# Patient Record
Sex: FEMALE | Race: WHITE | Employment: PART TIME | URBAN - METROPOLITAN AREA
[De-identification: names, ages, dates, MRNs, and addresses within clinical notes are randomized per-mention and may not be internally consistent; named-entity substitution may affect disease eponyms.]

---

## 2018-05-12 LAB
CHOLESTEROL, TOTAL: 180 MG/DL
CHOLESTEROL/HDL RATIO: 3.1
HDLC SERPL-MCNC: 59 MG/DL (ref 35–70)
LDL CHOLESTEROL CALCULATED: 108 MG/DL (ref 0–160)
NONHDLC SERPL-MCNC: NORMAL MG/DL
TRIGL SERPL-MCNC: 63 MG/DL
VLDLC SERPL CALC-MCNC: NORMAL MG/DL

## 2018-05-14 ENCOUNTER — TELEPHONE (OUTPATIENT)
Dept: FAMILY MEDICINE CLINIC | Age: 38
End: 2018-05-14

## 2018-05-15 ENCOUNTER — TELEPHONE (OUTPATIENT)
Dept: INTERNAL MEDICINE CLINIC | Age: 38
End: 2018-05-15

## 2018-05-16 ENCOUNTER — OFFICE VISIT (OUTPATIENT)
Dept: INTERNAL MEDICINE CLINIC | Age: 38
End: 2018-05-16

## 2018-05-16 VITALS
BODY MASS INDEX: 25.16 KG/M2 | HEART RATE: 82 BPM | SYSTOLIC BLOOD PRESSURE: 100 MMHG | DIASTOLIC BLOOD PRESSURE: 76 MMHG | OXYGEN SATURATION: 98 % | HEIGHT: 64 IN | WEIGHT: 147.4 LBS

## 2018-05-16 DIAGNOSIS — F41.9 ANXIETY: Primary | ICD-10-CM

## 2018-05-16 DIAGNOSIS — F41.9 ANXIETY: ICD-10-CM

## 2018-05-16 LAB
T4 FREE: 1.5 NG/DL (ref 0.9–1.8)
TSH SERPL DL<=0.05 MIU/L-ACNC: 0.65 UIU/ML (ref 0.27–4.2)

## 2018-05-16 PROCEDURE — 99214 OFFICE O/P EST MOD 30 MIN: CPT | Performed by: NURSE PRACTITIONER

## 2018-05-16 RX ORDER — LORAZEPAM 1 MG/1
1 TABLET ORAL 2 TIMES DAILY
COMMUNITY
Start: 2018-05-12 | End: 2020-11-09

## 2018-05-16 RX ORDER — ESCITALOPRAM OXALATE 5 MG/1
5 TABLET ORAL DAILY
Qty: 30 TABLET | Refills: 0 | Status: SHIPPED | OUTPATIENT
Start: 2018-05-16 | End: 2018-07-09 | Stop reason: SDUPTHER

## 2018-05-16 ASSESSMENT — PATIENT HEALTH QUESTIONNAIRE - PHQ9
SUM OF ALL RESPONSES TO PHQ9 QUESTIONS 1 & 2: 0
1. LITTLE INTEREST OR PLEASURE IN DOING THINGS: 0
2. FEELING DOWN, DEPRESSED OR HOPELESS: 0
SUM OF ALL RESPONSES TO PHQ QUESTIONS 1-9: 0

## 2018-05-17 ASSESSMENT — ENCOUNTER SYMPTOMS
GASTROINTESTINAL NEGATIVE: 1
CHEST TIGHTNESS: 1

## 2018-07-18 ENCOUNTER — TELEPHONE (OUTPATIENT)
Dept: INTERNAL MEDICINE CLINIC | Age: 38
End: 2018-07-18

## 2020-11-09 ENCOUNTER — NURSE TRIAGE (OUTPATIENT)
Dept: OTHER | Facility: CLINIC | Age: 40
End: 2020-11-09

## 2020-11-09 ENCOUNTER — OFFICE VISIT (OUTPATIENT)
Dept: FAMILY MEDICINE CLINIC | Age: 40
End: 2020-11-09
Payer: COMMERCIAL

## 2020-11-09 VITALS
OXYGEN SATURATION: 98 % | HEIGHT: 64 IN | HEART RATE: 87 BPM | DIASTOLIC BLOOD PRESSURE: 80 MMHG | WEIGHT: 146 LBS | TEMPERATURE: 98.4 F | BODY MASS INDEX: 24.92 KG/M2 | SYSTOLIC BLOOD PRESSURE: 118 MMHG

## 2020-11-09 DIAGNOSIS — F41.1 GENERALIZED ANXIETY DISORDER: ICD-10-CM

## 2020-11-09 LAB
A/G RATIO: 2.1 (ref 1.1–2.2)
ALBUMIN SERPL-MCNC: 4.8 G/DL (ref 3.4–5)
ALP BLD-CCNC: 57 U/L (ref 40–129)
ALT SERPL-CCNC: 11 U/L (ref 10–40)
ANION GAP SERPL CALCULATED.3IONS-SCNC: 13 MMOL/L (ref 3–16)
AST SERPL-CCNC: 16 U/L (ref 15–37)
BASOPHILS ABSOLUTE: 0 K/UL (ref 0–0.2)
BASOPHILS RELATIVE PERCENT: 0.5 %
BILIRUB SERPL-MCNC: 0.8 MG/DL (ref 0–1)
BUN BLDV-MCNC: 7 MG/DL (ref 7–20)
CALCIUM SERPL-MCNC: 9.9 MG/DL (ref 8.3–10.6)
CHLORIDE BLD-SCNC: 101 MMOL/L (ref 99–110)
CO2: 24 MMOL/L (ref 21–32)
CREAT SERPL-MCNC: 0.6 MG/DL (ref 0.6–1.1)
EOSINOPHILS ABSOLUTE: 0 K/UL (ref 0–0.6)
EOSINOPHILS RELATIVE PERCENT: 0.1 %
GFR AFRICAN AMERICAN: >60
GFR NON-AFRICAN AMERICAN: >60
GLOBULIN: 2.3 G/DL
GLUCOSE BLD-MCNC: 85 MG/DL (ref 70–99)
HCT VFR BLD CALC: 41.1 % (ref 36–48)
HEMOGLOBIN: 13.4 G/DL (ref 12–16)
LYMPHOCYTES ABSOLUTE: 1.1 K/UL (ref 1–5.1)
LYMPHOCYTES RELATIVE PERCENT: 16.4 %
MCH RBC QN AUTO: 27.9 PG (ref 26–34)
MCHC RBC AUTO-ENTMCNC: 32.7 G/DL (ref 31–36)
MCV RBC AUTO: 85.3 FL (ref 80–100)
MONOCYTES ABSOLUTE: 0.4 K/UL (ref 0–1.3)
MONOCYTES RELATIVE PERCENT: 5.3 %
NEUTROPHILS ABSOLUTE: 5.2 K/UL (ref 1.7–7.7)
NEUTROPHILS RELATIVE PERCENT: 77.7 %
PDW BLD-RTO: 13.7 % (ref 12.4–15.4)
PLATELET # BLD: 235 K/UL (ref 135–450)
PMV BLD AUTO: 9.2 FL (ref 5–10.5)
POTASSIUM SERPL-SCNC: 4.2 MMOL/L (ref 3.5–5.1)
RBC # BLD: 4.82 M/UL (ref 4–5.2)
SODIUM BLD-SCNC: 138 MMOL/L (ref 136–145)
TOTAL PROTEIN: 7.1 G/DL (ref 6.4–8.2)
TSH REFLEX FT4: 0.73 UIU/ML (ref 0.27–4.2)
WBC # BLD: 6.6 K/UL (ref 4–11)

## 2020-11-09 PROCEDURE — G0444 DEPRESSION SCREEN ANNUAL: HCPCS | Performed by: FAMILY MEDICINE

## 2020-11-09 PROCEDURE — 99203 OFFICE O/P NEW LOW 30 MIN: CPT | Performed by: FAMILY MEDICINE

## 2020-11-09 RX ORDER — BUSPIRONE HYDROCHLORIDE 5 MG/1
5 TABLET ORAL 3 TIMES DAILY
Qty: 90 TABLET | Refills: 0 | Status: SHIPPED | OUTPATIENT
Start: 2020-11-09 | End: 2020-12-18

## 2020-11-09 RX ORDER — MAGNESIUM 200 MG
936 TABLET ORAL DAILY
COMMUNITY
End: 2021-05-26

## 2020-11-09 RX ORDER — PREGNENOLONE, MICRONIZED 100 %
10 POWDER (GRAM) MISCELLANEOUS 2 TIMES DAILY
COMMUNITY
End: 2021-04-19

## 2020-11-09 RX ORDER — SERTRALINE HYDROCHLORIDE 25 MG/1
25 TABLET, FILM COATED ORAL DAILY
Qty: 30 TABLET | Refills: 3 | Status: SHIPPED | OUTPATIENT
Start: 2020-11-09 | End: 2021-04-19

## 2020-11-09 SDOH — HEALTH STABILITY: MENTAL HEALTH: HOW MANY STANDARD DRINKS CONTAINING ALCOHOL DO YOU HAVE ON A TYPICAL DAY?: 1 OR 2

## 2020-11-09 SDOH — HEALTH STABILITY: MENTAL HEALTH: HOW OFTEN DO YOU HAVE A DRINK CONTAINING ALCOHOL?: MONTHLY OR LESS

## 2020-11-09 ASSESSMENT — PATIENT HEALTH QUESTIONNAIRE - PHQ9
6. FEELING BAD ABOUT YOURSELF - OR THAT YOU ARE A FAILURE OR HAVE LET YOURSELF OR YOUR FAMILY DOWN: 0
SUM OF ALL RESPONSES TO PHQ QUESTIONS 1-9: 2
9. THOUGHTS THAT YOU WOULD BE BETTER OFF DEAD, OR OF HURTING YOURSELF: 0
5. POOR APPETITE OR OVEREATING: 0
4. FEELING TIRED OR HAVING LITTLE ENERGY: 2
SUM OF ALL RESPONSES TO PHQ9 QUESTIONS 1 & 2: 0
SUM OF ALL RESPONSES TO PHQ QUESTIONS 1-9: 2
SUM OF ALL RESPONSES TO PHQ QUESTIONS 1-9: 2
3. TROUBLE FALLING OR STAYING ASLEEP: 0
2. FEELING DOWN, DEPRESSED OR HOPELESS: 0
8. MOVING OR SPEAKING SO SLOWLY THAT OTHER PEOPLE COULD HAVE NOTICED. OR THE OPPOSITE, BEING SO FIGETY OR RESTLESS THAT YOU HAVE BEEN MOVING AROUND A LOT MORE THAN USUAL: 0
7. TROUBLE CONCENTRATING ON THINGS, SUCH AS READING THE NEWSPAPER OR WATCHING TELEVISION: 0
1. LITTLE INTEREST OR PLEASURE IN DOING THINGS: 0

## 2020-11-09 ASSESSMENT — ENCOUNTER SYMPTOMS
CONSTIPATION: 0
ABDOMINAL PAIN: 0
RHINORRHEA: 0
CHEST TIGHTNESS: 0
SORE THROAT: 0
DIARRHEA: 0
SHORTNESS OF BREATH: 0

## 2020-11-09 NOTE — PATIENT INSTRUCTIONS
Hi Ms. Heydi Aguilar,  It was a pleasure meeting you today. As discussed:  ·  I have placed a referral for psychology, please call them if you do not hear from them in 7 days. · I have sent in the prescriptions as discussed to your pharmacy, you can call your pharmacy for all refill requests. · Please get your labs done at your earliest convenience-non-fasting. We will call you with the results. Please do not hesitate to call or send a message if you have questions or concerns. Our goal is to ensure your complete wellbeing. Enjoy the rest of the week.   Dr Gutierrez Perez

## 2020-11-09 NOTE — TELEPHONE ENCOUNTER
Increasing anxiety over the past 4 weeks. Pressure in head and chest off and on. Having trouble dealing with even minor stress lately. Stays in bed a lot. She has been seeing a homeopathic/chiroprator for her symptoms lately. Reason for Disposition   Symptoms interfere with work or school    Answer Assessment - Initial Assessment Questions  1. CONCERN: \"What happened that made you call today? \"      See above    2. ANXIETY SYMPTOM SCREENING: \"Can you describe how you have been feeling? \"  (e.g., tense, restless, panicky, anxious, keyed up, trouble sleeping, trouble concentrating)      Anxious, restless. Denies sleep disorders. Has a history of anxiety with past hopsitalizations     3. ONSET: \"How long have you been feeling this way? \"      See above    4. RECURRENT: \"Have you felt this way before? \"  If yes: \"What happened that time? \" \"What helped these feelings go away in the past?\"       Yes. See above    5. RISK OF HARM - SUICIDAL IDEATION:  \"Do you ever have thoughts of hurting or killing yourself? \"  (e.g., yes, no, no but preoccupation with thoughts about death)    - INTENT:  \"Do you have thoughts of hurting or killing yourself right NOW? \" (e.g., yes, no, N/A)    - PLAN: \"Do you have a specific plan for how you would do this? \" (e.g., gun, knife, overdose, no plan, N/A)      Denies    6. RISK OF HARM - HOMICIDAL IDEATION:  \"Do you ever have thoughts of hurting or killing someone else? \"  (e.g., yes, no, no but preoccupation with thoughts about death)    - INTENT:  \"Do you have thoughts of hurting or killing someone right NOW? \" (e.g., yes, no, N/A)    - PLAN: \"Do you have a specific plan for how you would do this? \" (e.g., gun, knife, no plan, N/A)       Denies    7. FUNCTIONAL IMPAIRMENT: \"How have things been going for you overall in your life? Have you had any more difficulties than usual doing your normal daily activities? \"  (e.g., better, same, worse; self-care, school, work, interactions)      Work part time and does not have issues with working until this past weekend when stress increased. Anxiety worse in evenings after work. 8. SUPPORT: \"Who is with you now? \" \"Who do you live with?\" \"Do you have family or friends nearby who you can talk to? \"           9. THERAPIST: \"Do you have a counselor or therapist? Name? \"      Denies    10. STRESSORS: \"Has there been any new stress or recent changes in your life? \"        Started a new job about a year ago. The pandemic issues. 11. CAFFEINE ABUSE: \"Do you drink caffeinated beverages, and how much each day? \" (e.g., coffee, tea, sarah)        Denies    12. SUBSTANCE ABUSE: \"Do you use any illegal drugs or alcohol? \"        Denies    13. OTHER SYMPTOMS: \"Do you have any other physical symptoms right now? \" (e.g., chest pain, palpitations, difficulty breathing, fever)        Head and chest pressure when stress increases. 14. PREGNANCY: \"Is there any chance you are pregnant? \" \"When was your last menstrual period? \"        Denies    Protocols used: ANXIETY AND PANIC ATTACK-ADULT-[place]    Patient called pre-service center [facility]) to schedule appointment, with red flag complaint, transferred to RN access for triage. See above questions and answers. Caller talking full sentences without any distress on phone. Discussed disposition and patient agreeable. Discussed potential consequences for not following disposition recommendation. Aware to call back with any concerns or persistent, worsening, or new symptoms develop. Warm transfer to [facility] scheduling for appointment. Patient has a desire to see someone today. Discussed ED/UC if unable to get a new patient appointment today. Attention Provider: Thank you for allowing me to participate in the care of your patient. The  patient was connected to triage in response to information provided to the [facility].  Please do not respond through this encounter as the response is not directed to a shared pool.

## 2020-11-09 NOTE — LETTER
[facility]  [address] [place] [address],[address]  Phone: [phone]  Fax: [phone]    Bo Zamora MD        November 9, 2020     Patient: Marika Moran   YOB: 1980   Date of Visit: 11/9/2020       To Whom it May Concern:    Marika Moran was seen in my clinic on 11/9/2020. She may return to work on 11/16/2020. If you have any questions or concerns, please don't hesitate to call.     Sincerely,         Bo Zamora MD

## 2020-11-09 NOTE — PROGRESS NOTES
Subjective:   Patient ID: Theresa Kawasaki is a 44 y.o. female here today to establish care. HPI by clinical support staff:   Patient treatment team includes:   Preliminary data above this line collected by clinical support staff.    ______________________________________________________________________     HPI by Provider:   HPI   Patient presents via  to establish care. History reviewed and updated with patient today. Reports a history of anxiety since age 11- daughter of a  so was very worried when she started school. Reports 2 years ago had a panic attack- full cardiac work up was negative. Started seeing [facility] and was started on multiple vitamins which were helping till a week ago. States she was told by chiropractor she has hormonal derangement causing the anxiety. Patient is very worried something is wrong with her internal organs causing the anxiety. No suicidal or homicidal ideation- has good support at home. Not seeing a psychologist.  Jenn Nolen a few days years ago and it caused insomnia. Data above this line collected by Provider. Patient's medications, allergies, past medical, surgical, social and family histories were reviewed and updated as appropriate.     Patient Care Team:  Arjun Ramirez MD as PCP - General (Family Medicine)  Allergies   Allergen Reactions    Percocet [Oxycodone-Acetaminophen] Palpitations    Vicodin [Hydrocodone-Acetaminophen] Palpitations     Current Outpatient Medications on File Prior to Visit   Medication Sig Dispense Refill    magnesium 200 MG TABS tablet Take 936 mg by mouth daily       Pregnenolone Micronized POWD 10 mg by Does not apply route 2 times daily      NONFORMULARY as needed Relax Tone      NONFORMULARY Truadapt- Stress      Multiple Vitamins-Minerals (MULTIVITAMIN PO) Take 4 tablets by mouth daily       Lactobacillus (ACIDOPHILUS) CAPS Take 2 capsules by mouth every morning        No current facility-administered medications on file prior to visit. Review of Systems   Constitutional: Negative for activity change, appetite change, fatigue and fever. HENT: Negative for congestion, rhinorrhea and sore throat. Respiratory: Negative for chest tightness and shortness of breath. Cardiovascular: Negative for chest pain, palpitations and leg swelling. Gastrointestinal: Negative for abdominal pain, constipation and diarrhea. Genitourinary: Negative for dysuria and frequency. Musculoskeletal: Negative for arthralgias. Neurological: Negative for dizziness, weakness and headaches. Psychiatric/Behavioral: Positive for decreased concentration. Negative for agitation, confusion, dysphoric mood, hallucinations, self-injury, sleep disturbance and suicidal ideas. The patient is nervous/anxious and is hyperactive. All other systems reviewed and are negative. ROS above this line reviewed by Provider. Objective:   /80 (Site: Left Upper Arm, Position: Sitting, Cuff Size: Small Adult)   Pulse 87   Temp 98.4 °F (36.9 °C) (Temporal)   Ht 5' 4\" (1.626 m)   Wt 146 lb (66.2 kg)   LMP 10/28/2020   SpO2 98%   BMI 25.06 kg/m²   Physical Exam  Vitals signs and nursing note reviewed. Constitutional:       General: She is not in acute distress. Appearance: Normal appearance. She is normal weight. She is not ill-appearing, toxic-appearing or diaphoretic. HENT:      Head: Normocephalic and atraumatic. Right Ear: Tympanic membrane, ear canal and external ear normal. There is no impacted cerumen. Left Ear: Tympanic membrane, ear canal and external ear normal. There is no impacted cerumen. Nose: Nose normal. No congestion. Mouth/Throat:      Mouth: Mucous membranes are moist.      Pharynx: No oropharyngeal exudate or posterior oropharyngeal erythema. Eyes:      General: No scleral icterus.      Conjunctiva/sclera: Conjunctivae normal.   Neck:      Musculoskeletal: Normal range of motion and neck supple. Cardiovascular:      Rate and Rhythm: Normal rate and regular rhythm. Heart sounds: Normal heart sounds. No murmur. No friction rub. No gallop. Pulmonary:      Effort: Pulmonary effort is normal. No respiratory distress. Breath sounds: Normal breath sounds. No stridor. No wheezing, rhonchi or rales. Abdominal:      General: Abdomen is flat. Bowel sounds are normal. There is no distension. Palpations: Abdomen is soft. Tenderness: There is no abdominal tenderness. Skin:     General: Skin is warm and dry. Neurological:      General: No focal deficit present. Mental Status: She is alert. Psychiatric:         Mood and Affect: Mood normal.         Behavior: Behavior normal.       Lab Results   Component Value Date    WBC 5.8 07/28/2014    HGB 13.0 07/28/2014    HCT 38.9 07/28/2014    MCV 83.8 07/28/2014     07/28/2014     Lab Results   Component Value Date     07/28/2014    K 4.2 07/28/2014    BUN 9 07/28/2014    CREATININE 0.6 07/28/2014    GLUCOSE 83 07/28/2014    CALCIUM 9.6 07/28/2014    BILITOT 0.5 07/28/2014    ALKPHOS 63 07/28/2014    AST 17 07/28/2014    ALT 10 07/28/2014    GFRAA >60 07/28/2014     Lab Results   Component Value Date    TSH 0.65 05/16/2018     No results found for: LABA1C  No results found for: EAG  Lab Results   Component Value Date    CHOL 209 07/28/2014    TRIG 67 07/28/2014    HDL 78 07/28/2014     No results found for: Kris Logan [id number]  Lab Results   Component Value Date    VITD25 25 07/28/2014     Assessment and Plan:   1. Generalized anxiety disorder  Severe, patient very worried about everything. Lexapro caused insomnia, will try Sertraline, Buspar as needed. Consider clonazepam add on if trouble sleeping.  - CBC Auto Differential; Future  - Comprehensive Metabolic Panel; Future  - TSH WITH REFLEX TO FT4; Future  - VITAMIN D 25 HYDROXY; Future  - VITAMIN B12 & FOLATE;  Future  - External Referral To Psychology  - sertraline (ZOLOFT) 25 MG tablet; Take 1 tablet by mouth daily  Dispense: 30 tablet; Refill: 3  - busPIRone (BUSPAR) 5 MG tablet; Take 1 tablet by mouth 3 times daily  Dispense: 90 tablet; Refill: 0         This chart note was prepared using a voice recognition dictation program. This note was reviewed for accuracy; however, addition, deletion and sound-alike word errors may occur. If there are any questions regarding this chart note, please contact the originating provider. Electronically signed by   Yojana Addison MD  11/9/2020   1:14 PM    Return in about 4 weeks (around 12/7/2020) for Anxiety.

## 2020-11-10 ENCOUNTER — PATIENT MESSAGE (OUTPATIENT)
Dept: FAMILY MEDICINE CLINIC | Age: 40
End: 2020-11-10

## 2020-11-10 LAB
FOLATE: >20 NG/ML (ref 4.78–24.2)
VITAMIN B-12: 934 PG/ML (ref 211–911)
VITAMIN D 25-HYDROXY: 38.5 NG/ML

## 2020-11-10 NOTE — TELEPHONE ENCOUNTER
From: Taina Atkins  To: Pascual Kruse MD  Sent: 11/10/2020 3:14 PM EST  Subject: Test Results Question    Dr. Mikel Serra,    Thank you for the information on the blood work. From our conversation yesterday I believe you mentioned that my thyroid level was on the low side of someone my age. It did come up, but not much from last time. Could you please provide a referral for an endocrinologist to potentially investigate that further and anything else before we proceed with our plan?

## 2020-11-11 ENCOUNTER — PATIENT MESSAGE (OUTPATIENT)
Dept: FAMILY MEDICINE CLINIC | Age: 40
End: 2020-11-11

## 2020-11-11 NOTE — TELEPHONE ENCOUNTER
From: Mae Hanley  To: Chary Rios MD  Sent: 11/11/2020 1:19 PM EST  Subject: Test Results Question    Thank you for the referral. I scheduled the appointment but was not able to get in until next week. I was wondering if I could contact the endocrinologist office at [facility] to see if they could get me in this week?      ----- Message -----   From:Audrey Rayo MD   Sent:11/11/2020 8:34 AM EST   To:Marinagel Gandara   Subject:RE: Test Results 23 [facility], Cholesterol and Diabetes- [facility], [address] [facility] [address] [facility], [address] [place] [address]  Phone: [phone]      ----- Message -----   From:Mariangel Gandara   Sent:11/10/2020 9:22 PM EST   To:Audrey Rayo MD   Subject:Test Results Question    If you could refer me to one, I would appreciate it. Thank you.      ----- Message -----   From:Audrey Rayo MD   Sent:11/10/2020 6:30 PM EST   To:Mariangel Gandara   Subject:RE: Test Results Question    It is not a significant level to be investigated further unless you had symptoms. I can refer you to one but they wont do much more at this point.      ----- Message -----   From:Mariangel Gandara   Sent:11/10/2020 3:14 PM EST   To:Audrey Rayo MD   Subject:Test Results Question    Dr. Jerzy Carr,    Thank you for the information on the blood work. From our conversation yesterday I believe you mentioned that my thyroid level was on the low side of someone my age. It did come up, but not much from last time. Could you please provide a referral for an endocrinologist to potentially investigate that further and anything else before we proceed with our plan?

## 2020-11-11 NOTE — TELEPHONE ENCOUNTER
From: Mabel Diallo  To: Suad Peñaloza MD  Sent: 11/10/2020 9:22 PM EST  Subject: Test Results Question    If you could refer me to one, I would appreciate it. Thank you.      ----- Message -----   From:Audrey Valentine MD   Sent:11/10/2020 6:30 PM EST   To:Mariangel Dobbins   Subject:RE: Test Results Question    It is not a significant level to be investigated further unless you had symptoms. I can refer you to one but they wont do much more at this point.      ----- Message -----   From:Mariangel Dobbins   Sent:11/10/2020 3:14 PM EST   To:Audrey Vaelntine MD   Subject:Test Results Question    Dr. Estevan Salgado,    Thank you for the information on the blood work. From our conversation yesterday I believe you mentioned that my thyroid level was on the low side of someone my age. It did come up, but not much from last time. Could you please provide a referral for an endocrinologist to potentially investigate that further and anything else before we proceed with our plan?

## 2020-11-17 ENCOUNTER — VIRTUAL VISIT (OUTPATIENT)
Dept: FAMILY MEDICINE CLINIC | Age: 40
End: 2020-11-17
Payer: COMMERCIAL

## 2020-11-17 PROCEDURE — 99213 OFFICE O/P EST LOW 20 MIN: CPT | Performed by: FAMILY MEDICINE

## 2020-11-17 ASSESSMENT — ENCOUNTER SYMPTOMS
RHINORRHEA: 0
SHORTNESS OF BREATH: 0
CHEST TIGHTNESS: 0
CONSTIPATION: 0
DIARRHEA: 0
SORE THROAT: 0
ABDOMINAL PAIN: 0

## 2020-11-17 NOTE — PROGRESS NOTES
activity change, appetite change, fatigue and fever. HENT: Negative for congestion, rhinorrhea and sore throat. Respiratory: Negative for chest tightness and shortness of breath. Cardiovascular: Negative for chest pain, palpitations and leg swelling. Gastrointestinal: Negative for abdominal pain, constipation and diarrhea. Genitourinary: Negative for dysuria and frequency. Musculoskeletal: Negative for arthralgias. Neurological: Negative for dizziness, weakness and headaches. Psychiatric/Behavioral: Negative for agitation, confusion, decreased concentration, dysphoric mood, hallucinations, self-injury, sleep disturbance and suicidal ideas. The patient is nervous/anxious. The patient is not hyperactive. All other systems reviewed and are negative. ROS above this line reviewed by Provider. Objective:   LMP 10/28/2020   Physical Exam  Nursing note reviewed. Constitutional:       General: She is not in acute distress. Appearance: Normal appearance. She is normal weight. She is not ill-appearing, toxic-appearing or diaphoretic. Comments: Well groomed   HENT:      Head: Normocephalic and atraumatic. Neck:      Musculoskeletal: Normal range of motion. Pulmonary:      Effort: Pulmonary effort is normal.      Comments: Speaking in full sentences  Neurological:      Mental Status: She is alert. Psychiatric:         Mood and Affect: Mood normal.         Behavior: Behavior normal.         Thought Content: Thought content normal.       Assessment and Plan:   1. Generalized anxiety disorder  Stable, will consider starting Buspar/ Zoloft if symptoms flare up. Convinced her thyroid if off although labs normal.  Will see endocrinologist tomorrow. Manuel Mcbride  is a 44 y.o. female being evaluated by a Virtual Visit (video visit) encounter to address concerns as mentioned above. A caregiver was present when appropriate.  Due to this being a TeleHealth encounter (During COVID-19 public health emergency), evaluation of the following organ systems was limited: Vitals/Constitutional/EENT/Resp/CV/GI//MS/Neuro/Skin/Heme-Lymph-Imm. Pursuant to the emergency declaration under the [address] [place] [address], [address] [place] [address] authority and the [employer] Act, this Virtual Visit was conducted with patient's (and/or legal guardian's) consent, to reduce the patient's risk of exposure to COVID-19 and provide necessary medical care. The patient (and/or legal guardian) has also been advised to contact this office for worsening conditions or problems, and seek emergency medical treatment and/or call 911 if deemed necessary. Patient identification was verified at the start of the visit: Yes    Total time spent for this encounter: Not billed by time    Services were provided through a video synchronous discussion virtually to substitute for in-person clinic visit. Patient  located at their individual homes. This chart note was prepared using a voice recognition dictation program. This note was reviewed for accuracy; however, addition, deletion and sound-alike word errors may occur. If there are any questions regarding this chart note, please contact the originating provider. Electronically signed by   Iglesia Moreno MD  11/17/2020   1:18 PM    No follow-ups on file.

## 2020-12-18 RX ORDER — BUSPIRONE HYDROCHLORIDE 5 MG/1
TABLET ORAL
Qty: 90 TABLET | Refills: 0 | Status: SHIPPED | OUTPATIENT
Start: 2020-12-18 | End: 2021-05-05 | Stop reason: SDUPTHER

## 2021-04-15 ENCOUNTER — VIRTUAL VISIT (OUTPATIENT)
Dept: FAMILY MEDICINE CLINIC | Age: 41
End: 2021-04-15
Payer: COMMERCIAL

## 2021-04-15 DIAGNOSIS — F41.1 GENERALIZED ANXIETY DISORDER: Primary | ICD-10-CM

## 2021-04-15 PROCEDURE — 99213 OFFICE O/P EST LOW 20 MIN: CPT | Performed by: FAMILY MEDICINE

## 2021-04-15 RX ORDER — MULTIVITAMIN WITH IRON
100 TABLET ORAL DAILY
COMMUNITY

## 2021-04-15 RX ORDER — VITAMIN B COMPLEX
1 CAPSULE ORAL DAILY
COMMUNITY
End: 2021-05-26

## 2021-04-15 ASSESSMENT — ENCOUNTER SYMPTOMS
ABDOMINAL PAIN: 0
SHORTNESS OF BREATH: 0
CHEST TIGHTNESS: 0
SORE THROAT: 0
RHINORRHEA: 0
DIARRHEA: 0
CONSTIPATION: 0

## 2021-04-15 NOTE — PROGRESS NOTES
Subjective:   Patient ID: Federico Rasmussen is a 36 y.o. female. HPI by clinical support staff:   Chief Complaint   Patient presents with    Fatigue      Preliminary data above this line collected by clinical support staff.    ______________________________________________________________________  HPI by Provider:   HPI    Patient reports feeling fatigue and \"weird sensation in her throat. Was doing well since last visit till about a month ago saw chiropractor who started he silvio vitamins again. Has been feeling very overwhelmed 2 weeks ago when children were on spring break. Has difficulty sleeping, \"my throat is fatigued and out of wack\", I feel my body is missing something- like a vitamin or something. Chiropractor had her hold two rods and was told her colon is not absorbing right and has B6 deficiencies. All she wants to do is sleep. Did not try Buspar or Zoloft, did not see psychologist or endocrinology. Wants to know which vitamin her body is missing. Was given Zinc, methyl b INBOYWC,L6/W4,YIS adapt without licorice root,core Burberry blend. Stopped yesterday. Data above this line collected by Provider. Patient's medications, allergies, past medical, surgical, social and family histories were reviewed and updated as appropriate.   Patient Care Team:  Fredi Solomon MD as PCP - General (Family Medicine)  Fredi Solomon MD as PCP - [facility] Empaneled Provider    Current Outpatient Medications on File Prior to Visit   Medication Sig Dispense Refill    vitamin B-6 (PYRIDOXINE) 100 MG tablet Take 100 mg by mouth daily      b complex vitamins capsule Take 1 capsule by mouth daily      VITAMIN D-VITAMIN K PO Take by mouth      ZINC PO Take by mouth      Lactobacillus (ACIDOPHILUS) CAPS Take 2 capsules by mouth every morning       busPIRone (BUSPAR) 5 MG tablet TAKE 1 TABLET BY MOUTH THREE TIMES A DAY (Patient not taking: Reported on 4/15/2021) 90 tablet 0    Probiotic Product (PROBIOTIC ADVANCED PO) Take by mouth      magnesium 200 MG TABS tablet Take 936 mg by mouth daily       Pregnenolone Micronized POWD 10 mg by Does not apply route 2 times daily      NONFORMULARY as needed Relax Tone      NONFORMULARY Truadapt- Stress      sertraline (ZOLOFT) 25 MG tablet Take 1 tablet by mouth daily (Patient not taking: Reported on 11/17/2020) 30 tablet 3    Multiple Vitamins-Minerals (MULTIVITAMIN PO) Take 4 tablets by mouth daily        No current facility-administered medications on file prior to visit. Review of Systems   Constitutional: Negative for activity change, appetite change, fatigue and fever. HENT: Negative for congestion, rhinorrhea and sore throat. Respiratory: Negative for chest tightness and shortness of breath. Cardiovascular: Negative for chest pain, palpitations and leg swelling. Gastrointestinal: Negative for abdominal pain, constipation and diarrhea. Genitourinary: Negative for dysuria and frequency. Musculoskeletal: Negative for arthralgias. Neurological: Negative for dizziness, weakness and headaches. Psychiatric/Behavioral: Negative for hallucinations. All other systems reviewed and are negative. ROS above this line reviewed by Provider. Objective: There were no vitals taken for this visit. Physical Exam  Nursing note reviewed. Constitutional:       General: She is not in acute distress. Appearance: Normal appearance. She is normal weight. She is not ill-appearing, toxic-appearing or diaphoretic. Comments: Well groomed   HENT:      Head: Normocephalic and atraumatic. Neck:      Musculoskeletal: Normal range of motion. Pulmonary:      Effort: Pulmonary effort is normal.      Comments: Speaking in full sentences  Neurological:      Mental Status: She is alert. Psychiatric:         Mood and Affect: Mood normal.         Behavior: Behavior normal.         Thought Content: Thought content normal.       Assessment and Plan:   1.  Generalized anxiety disorder  Advised to stop all supplements except probiotics, B vitamins and D.  Establish with psychologist- will benefit from CBT. Start Buspar as prescribed- not comfortable taking zoloft yet. Follow up  In 1 week. - External Referral To Psychology       Alexander Taylor  is a 36 y.o. female being evaluated by a Virtual Visit (video visit) encounter to address concerns as mentioned above. A caregiver was present when appropriate. Due to this being a TeleHealth encounter (During [place] [address] public health emergency), evaluation of the following organ systems was limited: Vitals/Constitutional/EENT/Resp/CV/GI//MS/Neuro/Skin/Heme-Lymph-Imm. Pursuant to the emergency declaration under the [address] [place] [address], [address] [place] [address] authority and the [employer] Act, this Virtual Visit was conducted with patient's (and/or legal guardian's) consent, to reduce the patient's risk of exposure to COVID-19 and provide necessary medical care. The patient (and/or legal guardian) has also been advised to contact this office for worsening conditions or problems, and seek emergency medical treatment and/or call 911 if deemed necessary. Patient identification was verified at the start of the visit: Yes    Total time spent for this encounter: Not billed by time    Services were provided through a video synchronous discussion virtually to substitute for in-person clinic visit. Patient  located at their individual homes. This chart note was prepared using a voice recognition dictation program. This note was reviewed for accuracy; however, addition, deletion and sound-alike word errors may occur. If there are any questions regarding this chart note, please contact the originating provider. Electronically signed by   Raven Valle MD  4/15/2021   4:11 PM    No follow-ups on file.

## 2021-04-19 ENCOUNTER — VIRTUAL VISIT (OUTPATIENT)
Dept: FAMILY MEDICINE CLINIC | Age: 41
End: 2021-04-19
Payer: COMMERCIAL

## 2021-04-19 DIAGNOSIS — F41.1 GENERALIZED ANXIETY DISORDER: Primary | ICD-10-CM

## 2021-04-19 PROCEDURE — 99214 OFFICE O/P EST MOD 30 MIN: CPT | Performed by: FAMILY MEDICINE

## 2021-04-19 ASSESSMENT — ENCOUNTER SYMPTOMS
ABDOMINAL PAIN: 0
SHORTNESS OF BREATH: 0
CONSTIPATION: 0
RHINORRHEA: 0
CHEST TIGHTNESS: 0
SORE THROAT: 0
DIARRHEA: 0

## 2021-04-19 ASSESSMENT — PATIENT HEALTH QUESTIONNAIRE - PHQ9
2. FEELING DOWN, DEPRESSED OR HOPELESS: 0
SUM OF ALL RESPONSES TO PHQ9 QUESTIONS 1 & 2: 0
SUM OF ALL RESPONSES TO PHQ QUESTIONS 1-9: 0

## 2021-04-19 NOTE — PROGRESS NOTES
Subjective:   Patient ID: Allyn Lucas is a 36 y.o. female. HPI by clinical support staff:   Chief Complaint   Patient presents with    Anxiety     medication seems to be helping, still feels like body is whacked out       Preliminary data above this line collected by clinical support staff.    ____________________________________________________________________  HPI by Provider:   HPI   Patient started Buspar 4 days ago and feels 90% better but hasn't been able to go to her own house- living with her parents to avoid the \"noise\" at home with three sons fighting. She feels well rested here and has had the children come visit. Felt a bit overwhelmed when her sons came over yesterday but wasn't too bad. Has had to use Buspar TID. Sleeping well. Has been in touch with a psychologist waiting for appointment . Seeing endocrinology next month. Very worried she didn't raise kids right since they do things she thought them not to do - reassured that children are going through a phase of development. Data above this line collected by Provider. Patient's medications, allergies, past medical, surgical, social and family histories were reviewed and updated as appropriate.   Patient Care Team:  Cristóbal Meneses MD as PCP - General (Family Medicine)  Cristóbal Meneses MD as PCP - [facility] Empaneled Provider    Current Outpatient Medications on File Prior to Visit   Medication Sig Dispense Refill    vitamin B-6 (PYRIDOXINE) 100 MG tablet Take 100 mg by mouth daily      b complex vitamins capsule Take 1 capsule by mouth daily      VITAMIN D-VITAMIN K PO Take by mouth      ZINC PO Take by mouth      busPIRone (BUSPAR) 5 MG tablet TAKE 1 TABLET BY MOUTH THREE TIMES A DAY 90 tablet 0    magnesium 200 MG TABS tablet Take 936 mg by mouth daily       Multiple Vitamins-Minerals (MULTIVITAMIN PO) Take 4 tablets by mouth daily       Lactobacillus (ACIDOPHILUS) CAPS Take 2 capsules by mouth every morning        No current facility-administered medications on file prior to visit. Review of Systems   Constitutional: Negative for activity change, appetite change, fatigue and fever. HENT: Negative for congestion, rhinorrhea and sore throat. Respiratory: Negative for chest tightness and shortness of breath. Cardiovascular: Negative for chest pain, palpitations and leg swelling. Gastrointestinal: Negative for abdominal pain, constipation and diarrhea. Genitourinary: Negative for dysuria and frequency. Musculoskeletal: Negative for arthralgias. Neurological: Negative for dizziness, weakness and headaches. Psychiatric/Behavioral: Positive for decreased concentration and sleep disturbance. Negative for dysphoric mood, hallucinations, self-injury and suicidal ideas. The patient is nervous/anxious. The patient is not hyperactive. All other systems reviewed and are negative. ROS above this line reviewed by Provider. Objective: There were no vitals taken for this visit. Physical Exam  Nursing note reviewed. Constitutional:       General: She is not in acute distress. Appearance: Normal appearance. She is normal weight. She is not ill-appearing, toxic-appearing or diaphoretic. Comments: Well groomed   HENT:      Head: Normocephalic and atraumatic. Neck:      Musculoskeletal: Normal range of motion. Pulmonary:      Effort: Pulmonary effort is normal.      Comments: Speaking in full sentences  Neurological:      Mental Status: She is alert. Psychiatric:         Mood and Affect: Mood normal.         Behavior: Behavior normal.         Thought Content: Thought content normal.       Assessment and Plan:   1. Generalized anxiety disorder  Improved significantly- continue Buspar- increase to 7.5 mg TID. Follow up in  2 weeks or sooner if needed. Emmanuel Frost  is a 36 y.o. female being evaluated by a Virtual Visit (video visit) encounter to address concerns as mentioned above.   A caregiver was present when appropriate. Due to this being a TeleHealth encounter (During SVDAF-11 public health emergency), evaluation of the following organ systems was limited: Vitals/Constitutional/EENT/Resp/CV/GI//MS/Neuro/Skin/Heme-Lymph-Imm. Pursuant to the emergency declaration under the [address] [place] [address], [address] [place] [address] and the [employer] and [employer] Act, this Virtual Visit was conducted with patient's (and/or legal guardian's) consent, to reduce the patient's risk of exposure to COVID-19 and provide necessary medical care. The patient (and/or legal guardian) has also been advised to contact this office for worsening conditions or problems, and seek emergency medical treatment and/or call 911 if deemed necessary. Patient identification was verified at the start of the visit: Yes    Total time spent for this encounter: Not billed by time    Services were provided through a video synchronous discussion virtually to substitute for in-person clinic visit. Patient  located at their individual homes. This chart note was prepared using a voice recognition dictation program. This note was reviewed for accuracy; however, addition, deletion and sound-alike word errors may occur. If there are any questions regarding this chart note, please contact the originating provider. Electronically signed by   Jay Mathias MD  4/19/2021   3:39 PM    No follow-ups on file.

## 2021-05-05 ENCOUNTER — VIRTUAL VISIT (OUTPATIENT)
Dept: FAMILY MEDICINE CLINIC | Age: 41
End: 2021-05-05
Payer: COMMERCIAL

## 2021-05-05 DIAGNOSIS — F41.1 GENERALIZED ANXIETY DISORDER: Primary | ICD-10-CM

## 2021-05-05 PROCEDURE — 99214 OFFICE O/P EST MOD 30 MIN: CPT | Performed by: FAMILY MEDICINE

## 2021-05-05 RX ORDER — BUSPIRONE HYDROCHLORIDE 5 MG/1
7.5 TABLET ORAL 3 TIMES DAILY
Qty: 135 TABLET | Refills: 2 | Status: SHIPPED | OUTPATIENT
Start: 2021-05-05 | End: 2021-08-03

## 2021-05-05 SDOH — ECONOMIC STABILITY: FOOD INSECURITY: WITHIN THE PAST 12 MONTHS, YOU WORRIED THAT YOUR FOOD WOULD RUN OUT BEFORE YOU GOT MONEY TO BUY MORE.: NEVER TRUE

## 2021-05-05 SDOH — ECONOMIC STABILITY: INCOME INSECURITY: HOW HARD IS IT FOR YOU TO PAY FOR THE VERY BASICS LIKE FOOD, HOUSING, MEDICAL CARE, AND HEATING?: NOT HARD AT ALL

## 2021-05-05 ASSESSMENT — ENCOUNTER SYMPTOMS
CHEST TIGHTNESS: 0
ABDOMINAL PAIN: 0
CONSTIPATION: 0
SHORTNESS OF BREATH: 0
DIARRHEA: 0
RHINORRHEA: 0
SORE THROAT: 0

## 2021-05-05 NOTE — PROGRESS NOTES
Subjective:   Patient ID: Aj Rooney is a 36 y.o. female. HPI by clinical support staff:   Chief Complaint   Patient presents with    Follow-up     no complaints  vv call [phone]      Preliminary data above this line collected by clinical support staff.    ______________________________________________________________________  HPI by Provider:   HPI   Patient presents for follow up on anxiety. States she feels much better and usually takes Buspar at 7am and around 1pm but occasionally has taken it around bedtime because she wanted to maintain the feeling of \"feeling good\" but has found she either sees things or has her sleep interrupted if she takes a third dose close to bedtime. Takes 7.5mg. No side effects, feels anxious 1-2 times in last couple of weeks. Waiting for intake with Counsellor. Sleeps well most days occasionally wake sup. Has tried nothing for it. Data above this line collected by Provider. Patient's medications, allergies, past medical, surgical, social and family histories were reviewed and updated as appropriate. Patient Care Team:  Dianna Funez MD as PCP - General (Family Medicine)  Dianna Funez MD as PCP - REHABILITATION [facility] Empaneled Provider    Current Outpatient Medications on File Prior to Visit   Medication Sig Dispense Refill    vitamin B-6 (PYRIDOXINE) 100 MG tablet Take 100 mg by mouth daily      b complex vitamins capsule Take 1 capsule by mouth daily      VITAMIN D-VITAMIN K PO Take by mouth      ZINC PO Take by mouth      magnesium 200 MG TABS tablet Take 936 mg by mouth daily       Multiple Vitamins-Minerals (MULTIVITAMIN PO) Take 4 tablets by mouth daily       Lactobacillus (ACIDOPHILUS) CAPS Take 2 capsules by mouth every morning        No current facility-administered medications on file prior to visit. Review of Systems   Constitutional: Negative for activity change, appetite change, fatigue and fever.    HENT: Negative for congestion, rhinorrhea and sore throat. Respiratory: Negative for chest tightness and shortness of breath. Cardiovascular: Negative for chest pain, palpitations and leg swelling. Gastrointestinal: Negative for abdominal pain, constipation and diarrhea. Genitourinary: Negative for dysuria and frequency. Musculoskeletal: Negative for arthralgias. Neurological: Negative for dizziness, weakness and headaches. Psychiatric/Behavioral: Positive for sleep disturbance. Negative for decreased concentration, dysphoric mood, hallucinations, self-injury and suicidal ideas. The patient is nervous/anxious. All other systems reviewed and are negative. ROS above this line reviewed by Provider. Objective: There were no vitals taken for this visit. Physical Exam  Nursing note reviewed. Constitutional:       General: She is not in acute distress. Appearance: Normal appearance. She is normal weight. She is not ill-appearing, toxic-appearing or diaphoretic. Comments: Well groomed   HENT:      Head: Normocephalic and atraumatic. Neck:      Musculoskeletal: Normal range of motion. Pulmonary:      Effort: Pulmonary effort is normal.      Comments: Speaking in full sentences  Neurological:      Mental Status: She is alert. Psychiatric:         Mood and Affect: Mood normal.         Behavior: Behavior normal.         Thought Content: Thought content normal.       Assessment and Plan:   1. Generalized anxiety disorder  Significantly improved- Continue Buspar 7.5mg TID prn, may take  Up to 10mg TID. Call if symptoms change. Follow up  With psychologist.   follow up  In 1 month unless something changes. - busPIRone (BUSPAR) 5 MG tablet; Take 1.5 tablets by mouth 3 times daily  Dispense: 135 tablet; Refill: 2       Nish Dallas  is a 36 y.o. female being evaluated by a Virtual Visit (video visit) encounter to address concerns as mentioned above. A caregiver was present when appropriate.  Due to this being a TeleHealth encounter (During EAAXT-20 public health emergency), evaluation of the following organ systems was limited: Vitals/Constitutional/EENT/Resp/CV/GI//MS/Neuro/Skin/Heme-Lymph-Imm. Pursuant to the emergency declaration under the [facility], [address] [place] [address] and the [employer] and [employer] Act, this Virtual Visit was conducted with patient's (and/or legal guardian's) consent, to reduce the patient's risk of exposure to COVID-19 and provide necessary medical care. The patient (and/or legal guardian) has also been advised to contact this office for worsening conditions or problems, and seek emergency medical treatment and/or call 911 if deemed necessary. Patient identification was verified at the start of the visit: Yes    Total time spent for this encounter: Not billed by time    Services were provided through a video synchronous discussion virtually to substitute for in-person clinic visit. Patient  located at their individual homes. This chart note was prepared using a voice recognition dictation program. This note was reviewed for accuracy; however, addition, deletion and sound-alike word errors may occur. If there are any questions regarding this chart note, please contact the originating provider. Electronically signed by   Jay Mathias MD  5/5/2021   2:34 PM    No follow-ups on file.

## 2021-05-09 ENCOUNTER — PATIENT MESSAGE (OUTPATIENT)
Dept: FAMILY MEDICINE CLINIC | Age: 41
End: 2021-05-09

## 2021-05-10 NOTE — TELEPHONE ENCOUNTER
From: Federico Rasmussen  To: Fredi Solomon MD  Sent: 5/9/2021 3:33 PM EDT  Subject: Visit Follow-Up Question    Dr. Juanita Mccullough,    During our last visit you said I could take up to 30mg per day. I have been taking 7.5mg two times per day. I havent felt well the past 3 days. I feel like I need to increase, or do something that you would recommend. Since I felt like it was affecting my sleep, would it be ok to take up to 15 mg in the morning and 15mg again in the afternoon?     Thank you,  Federico Rasmussen

## 2021-05-14 ENCOUNTER — TELEPHONE (OUTPATIENT)
Dept: FAMILY MEDICINE CLINIC | Age: 41
End: 2021-05-14

## 2021-05-14 DIAGNOSIS — F41.9 ANXIETY: Primary | ICD-10-CM

## 2021-05-14 RX ORDER — HYDROXYZINE HYDROCHLORIDE 25 MG/1
12.5-25 TABLET, FILM COATED ORAL NIGHTLY
Qty: 30 TABLET | Refills: 0 | Status: SHIPPED | OUTPATIENT
Start: 2021-05-14 | End: 2021-06-07

## 2021-05-14 NOTE — TELEPHONE ENCOUNTER
Spoke to patient who feels her body crashed. Spoke to neighbor who said sometimes they do a colonoscopy to evaluate for abnormalities and isn't sure if she needs something like that to identify what is wrong with her body. Feels she just needs rest and will recover, now that she has work off her plate. She Is taking Buspar which helps but cant sleep much at night. Not tried anything else due to fear of side effects. Cancelled her psychologist appointment because she had no energy. Advised to get in with psychiatry- will plan to call . Follow up  With psychologist.  Rx sent for Hydroxyzine if benadryl doesn't help with sleep may start that. Never started her sertraline. No suicidal ideation/homicidal ideation.    Consider deplin if symptoms persist.

## 2021-05-14 NOTE — TELEPHONE ENCOUNTER
Patient was seen by us recently and called requesting to get a message to Dr. An Ramírez. Explained to patient that Dr. An Ramírez is out of the office today. Patient is going through some anxiety and has been on a medication to manage this. She tried to return to work after the last time of talking to us on a vv and when she did after 1.5 weeks she was dealing with depletion or exhaustion and barely able to get up and get to the bathroom and back to bed. Patient took off of work twice this year because of going through different things like this and resigned from her job this past week so she can focus on getting well again. Her mind is going to all different things and she is struggling to sleep any time during the night and keeps waking up. She feels like she really needs to rest to be able to have her body take steps to heal.    She has asked her parents to take turns coming over to her house to bring food. Laying down and resting is helping her but she is also struggling to sleep. Is there something she can take to help her sleep? If so, please send to [facility]. Please call patient to advise ASAP.

## 2021-05-26 ENCOUNTER — OFFICE VISIT (OUTPATIENT)
Dept: ENDOCRINOLOGY | Age: 41
End: 2021-05-26
Payer: COMMERCIAL

## 2021-05-26 VITALS
WEIGHT: 138.2 LBS | HEIGHT: 64 IN | SYSTOLIC BLOOD PRESSURE: 112 MMHG | DIASTOLIC BLOOD PRESSURE: 76 MMHG | BODY MASS INDEX: 23.6 KG/M2 | HEART RATE: 99 BPM | OXYGEN SATURATION: 97 %

## 2021-05-26 DIAGNOSIS — R23.2 FLUSHING: ICD-10-CM

## 2021-05-26 DIAGNOSIS — F41.9 ANXIETY: ICD-10-CM

## 2021-05-26 DIAGNOSIS — E07.9 THYROID DISEASE: ICD-10-CM

## 2021-05-26 DIAGNOSIS — R23.2 FLUSHING: Primary | ICD-10-CM

## 2021-05-26 LAB
A/G RATIO: 2.3 (ref 1.1–2.2)
ALBUMIN SERPL-MCNC: 5 G/DL (ref 3.4–5)
ALP BLD-CCNC: 58 U/L (ref 40–129)
ALT SERPL-CCNC: 8 U/L (ref 10–40)
ANION GAP SERPL CALCULATED.3IONS-SCNC: 14 MMOL/L (ref 3–16)
AST SERPL-CCNC: 14 U/L (ref 15–37)
BILIRUB SERPL-MCNC: 0.5 MG/DL (ref 0–1)
BUN BLDV-MCNC: 11 MG/DL (ref 7–20)
CALCIUM SERPL-MCNC: 10 MG/DL (ref 8.3–10.6)
CHLORIDE BLD-SCNC: 103 MMOL/L (ref 99–110)
CO2: 24 MMOL/L (ref 21–32)
CREAT SERPL-MCNC: 0.6 MG/DL (ref 0.6–1.1)
GFR AFRICAN AMERICAN: >60
GFR NON-AFRICAN AMERICAN: >60
GLOBULIN: 2.2 G/DL
GLUCOSE BLD-MCNC: 92 MG/DL (ref 70–99)
PARATHYROID HORMONE INTACT: 27.7 PG/ML (ref 14–72)
POTASSIUM SERPL-SCNC: 4 MMOL/L (ref 3.5–5.1)
SODIUM BLD-SCNC: 141 MMOL/L (ref 136–145)
T3 FREE: 3.3 PG/ML (ref 2.3–4.2)
T4 FREE: 1.4 NG/DL (ref 0.9–1.8)
TOTAL PROTEIN: 7.2 G/DL (ref 6.4–8.2)
TSH SERPL DL<=0.05 MIU/L-ACNC: 0.93 UIU/ML (ref 0.27–4.2)

## 2021-05-26 PROCEDURE — 99204 OFFICE O/P NEW MOD 45 MIN: CPT | Performed by: INTERNAL MEDICINE

## 2021-05-26 NOTE — LETTER
[facility]  [place] [address]  Phone: [phone]  Fax: [phone]           Aubrey Leonardo MD      May 26, 2021     Patient: Emmanuel Frost   MR Number: <[id number]>   YOB: 1980   Date of Visit: 5/26/2021       Dear Dr. Justino Lopez ref. provider found: Thank you for referring Emmanuel Frost to me for evaluation/treatment. Below are the relevant portions of my assessment and plan of care. If you have questions, please do not hesitate to call me. I look forward to following [place] along with you.     Sincerely,        Aubrey Leonardo MD    CC providers:  Arcelia Guaman MD  [facility] [place] [address]  [place]

## 2021-05-26 NOTE — PROGRESS NOTES
Patient ID: Lele Carrera is a 36 y.o. female    Chief Complaint: Thyroid disorder  Referred by Dr. Piter Plaza MD   Here with mother     HPI:   Lele Carrera is here for initial evaluation of thyroid evaluation     In 2018, she was having sleep issues, too much anxiety, panic attacks. Cardiac evaluation was normal.   Seeing chiropractor. Suggested to have adrenal disease. She had episodes again in Nov 2020. Episodes in April 2021 and again in May 2021. Episodes so bad she had to leave her work. She was working as a part time, [employer] teacher. She is staying with her parents. Energy levels are low. Can not handle stress   Shaking   She feels burning sensation under the ears   Occasional palpitations   Sleep is better recently   Weight loss of 8 lbs since Nov 2020   Usually cold    Other than episodes, she had stable anxiety   Menstrual cycles are regular   had vasectomy     Family history of thyroid disorder: None   No fam history of endocrine tumors     Recent iodine loading in form of contrast material for diagnostic studies/cardiac cath  Taking multiple supplements.    No recent URTI      The following portions of the patient's history were reviewed and updated as appropriate:       Family History   Problem Relation Age of Onset    Other Mother         Fibromyalgia    Arthritis Mother     Migraines Mother     High Blood Pressure Mother     Obesity Father     Other Father         Prediabetes    High Blood Pressure Maternal Grandfather     Diabetes Paternal Grandmother     Heart Attack Paternal Grandmother     Heart Attack Paternal Grandfather     Heart Disease Paternal Grandfather     Stroke Paternal Grandfather     Depression Sister     Cancer Maternal Grandmother         Skin    No Known Problems Son     No Known Problems Son     No Known Problems Son             Social History     Socioeconomic History    Marital status:      Spouse name: Not on file    Number of children: Not on file    Years of education: Not on file    Highest education level: Not on file   Occupational History    Occupation:     Tobacco Use    Smoking status: Never Smoker    Smokeless tobacco: Never Used   Vaping Use    Vaping Use: Never used   Substance and Sexual Activity    Alcohol use: Yes     Comment:  on occasion    Drug use: No    Sexual activity: Yes     Partners: Male     Birth control/protection: Surgical     Comment:  had vasectomy   Other Topics Concern    Not on file   Social History Narrative    Not on file     Social Determinants of Health     Financial Resource Strain: Low Risk     Difficulty of Paying Living Expenses: Not hard at all   Food Insecurity: No Food Insecurity    Worried About Running Out of Food in the Last Year: Never true    [address] in the Last Year: Never true   Transportation Needs: No Transportation Needs    Lack of Transportation (Medical): No    Lack of Transportation (Non-Medical):  No   Physical Activity:     Days of Exercise per Week:     Minutes of Exercise per Session:    Stress:     Feeling of Stress :    Social Connections:     Frequency of Communication with Friends and Family:     Frequency of Social Gatherings with Friends and Family:     Attends [employer] Services:     Active Member of Clubs or Organizations:     Attends Club or Organization Meetings:     Marital Status:    Intimate Partner Violence:     Fear of Current or Ex-Partner:     Emotionally Abused:     Physically Abused:     Sexually Abused:            Past Medical History:   Diagnosis Date    Anxiety     Blood transfusion 2007    x2 - after childbirth         Past Surgical History:   Procedure Laterality Date    DILATION AND CURETTAGE OF UTERUS  03/2007, 12/2011    EYE SURGERY Bilateral 2009    lasik    WISDOM TOOTH EXTRACTION  2005           Allergies   Allergen Reactions    Percocet [Oxycodone-Acetaminophen] Palpitations    Vicodin [Hydrocodone-Acetaminophen] Palpitations           Current Outpatient Medications:     NONFORMULARY, Reacted Zinc 54 mg 2 by mouth daily, Disp: , Rfl:     NONFORMULARY, [employer] Methyl B Complex 3 by mouth daily, Disp: , Rfl:     NONFORMULARY, Ortho Biotic 2 by mouth daily, Disp: , Rfl:     diphenhydrAMINE HCl (BENADRYL ALLERGY PO), Take by mouth For sleep, Disp: , Rfl:     vitamin B-6 (PYRIDOXINE) 100 MG tablet, Take 100 mg by mouth daily, Disp: , Rfl:     VITAMIN D-VITAMIN K PO, Take by mouth 5 drops daily, Disp: , Rfl:     hydrOXYzine (ATARAX) 25 MG tablet, Take 0.5-1 tablets by mouth nightly (Patient not taking: Reported on 5/26/2021), Disp: 30 tablet, Rfl: 0    busPIRone (BUSPAR) 5 MG tablet, Take 1.5 tablets by mouth 3 times daily (Patient not taking: Reported on 5/26/2021), Disp: 135 tablet, Rfl: 2      Review of Systems:  Constitutional: Negative for fever, chills. HENT: Negative for congestion, ear pain, rhinorrhea,  sore throat and trouble swallowing. Eyes: Negative for photophobia, redness, itching. Respiratory: Negative for cough, shortness of breath and sputum. Cardiovascular: Negative for chest pain and leg swelling. Gastrointestinal: Negative for nausea, vomiting, abdominal pain, diarrhea, constipation. Endocrine: Negative for polydipsia, polyphagia and polyuria. Genitourinary: Negative for dysuria, urgency, frequency, hematuria and flank pain. Musculoskeletal: Negative for myalgias, back pain, arthralgias. Skin/Nail: Negative for rash, itching. Normal nails. Neurological: Negative for seizures   Hematological/ Lymph nodes: Negative for adenopathy. Does not bruise/bleed easily. Psychiatric/Behavioral: Negative for suicidal ideas and decreased concentration.           Physical Exam:  /76 (Site: Right Upper Arm, Position: Sitting, Cuff Size: Small Adult)   Pulse 99   Ht 5' 4\" (1.626 m)   Wt 138 lb 3.2 oz (62.7 kg)   LMP 05/02/2021   SpO2 97%   Breastfeeding No   BMI 23.72 kg/m²   Constitutional: Patient is oriented to person, place, and time. Patient appears well-developed and well-nourished. HENT:    Head: Normocephalic and atraumatic. Eyes: Conjunctivae and EOM are normal. Pupils are equal, round, and reactive to light. Neck: Normal range of motion. Thyroid is normal.   Cardiovascular: Tachycardia, regular rhythm and normal heart sounds. Pulmonary/Chest: Effort normal and breath sounds normal.   Abdominal: Soft. Bowel sounds are normal.   Musculoskeletal: Normal range of motion. Neurological: Patient is alert and oriented to person, place, and time. Patient has normal reflexes. She has fine hand tremors. Skin: Skin is warm and dry. Psychiatric: Patient has a normal mood and affect.  Patient behavior is normal.     Lab Review:    Virtual Visit on 05/05/2021   Component Date Value Ref Range Status    Cholesterol, Total 05/12/2018 180  mg/dL Final    HDL 05/12/2018 59  35 - 70 mg/dL Final    LDL Calculated 05/12/2018 108  0 - 160 mg/dL Final    Triglycerides 05/12/2018 63  mg/dL Final    Chol/HDL Ratio 05/12/2018 3.1   Final   Orders Only on 11/09/2020   Component Date Value Ref Range Status    Vit D, 25-Hydroxy 11/09/2020 38.5  >=30 ng/mL Final    TSH Reflex FT4 11/09/2020 0.73  0.27 - 4.20 uIU/mL Final    Sodium 11/09/2020 138  136 - 145 mmol/L Final    Potassium 11/09/2020 4.2  3.5 - 5.1 mmol/L Final    Chloride 11/09/2020 101  99 - 110 mmol/L Final    CO2 11/09/2020 24  21 - 32 mmol/L Final    Anion Gap 11/09/2020 13  3 - 16 Final    Glucose 11/09/2020 85  70 - 99 mg/dL Final    BUN 11/09/2020 7  7 - 20 mg/dL Final    CREATININE 11/09/2020 0.6  0.6 - 1.1 mg/dL Final    GFR Non- 11/09/2020 >60  >60 Final    GFR  11/09/2020 >60  >60 Final    Calcium 11/09/2020 9.9  8.3 - 10.6 mg/dL Final    Total Protein 11/09/2020 7.1  6.4 - 8.2 g/dL Final    Albumin 11/09/2020 4.8  3.4 - 5.0 g/dL Final    Albumin/Globulin Ratio 11/09/2020 2.1  1.1 - 2.2 Final    Total Bilirubin 11/09/2020 0.8  0.0 - 1.0 mg/dL Final    Alkaline Phosphatase 11/09/2020 57  40 - 129 U/L Final    ALT 11/09/2020 11  10 - 40 U/L Final    AST 11/09/2020 16  15 - 37 U/L Final    Globulin 11/09/2020 2.3  g/dL Final    WBC 11/09/2020 6.6  4.0 - 11.0 K/uL Final    RBC 11/09/2020 4.82  4.00 - 5.20 M/uL Final    Hemoglobin 11/09/2020 13.4  12.0 - 16.0 g/dL Final    Hematocrit 11/09/2020 41.1  36.0 - 48.0 % Final    MCV 11/09/2020 85.3  80.0 - 100.0 fL Final    MCH 11/09/2020 27.9  26.0 - 34.0 pg Final    MCHC 11/09/2020 32.7  31.0 - 36.0 g/dL Final    RDW 11/09/2020 13.7  12.4 - 15.4 % Final    Platelets 86/58/9360 235  135 - 450 K/uL Final    MPV 11/09/2020 9.2  5.0 - 10.5 fL Final    Neutrophils % 11/09/2020 77.7  % Final    Lymphocytes % 11/09/2020 16.4  % Final    Monocytes % 11/09/2020 5.3  % Final    Eosinophils % 11/09/2020 0.1  % Final    Basophils % 11/09/2020 0.5  % Final    Neutrophils Absolute 11/09/2020 5.2  1.7 - 7.7 K/uL Final    Lymphocytes Absolute 11/09/2020 1.1  1.0 - 5.1 K/uL Final    Monocytes Absolute 11/09/2020 0.4  0.0 - 1.3 K/uL Final    Eosinophils Absolute 11/09/2020 0.0  0.0 - 0.6 K/uL Final    Basophils Absolute 11/09/2020 0.0  0.0 - 0.2 K/uL Final    Vitamin B-12 11/09/2020 934* 211 - 911 pg/mL Final    Folate 11/09/2020 >20.00  4.78 - 24.20 ng/mL Final        No results found. Assessment/Plan:     Mirella Bar was seen today for consultation and thyroid problem. Diagnoses and all orders for this visit:    Flushing  -     Metanephrines Plasma Free; Future  -     Calcitonin; Future  -     Gastrin; Future  -     Miscellaneous Sendout 1; Future  -     TSH without Reflex; Future  -     T4, Free; Future  -     T3, Free; Future  -     PTH, Intact; Future  -     Comprehensive Metabolic Panel;  Future    Thyroid disease  -     Metanephrines Plasma Free; Future  -     Calcitonin; Future  -     Gastrin; Future  -     Miscellaneous Sendout 1; Future  -     TSH without Reflex; Future  -     T4, Free; Future  -     T3, Free; Future  -     PTH, Intact; Future  -     Comprehensive Metabolic Panel; Future    Anxiety  -     Metanephrines Plasma Free; Future  -     Calcitonin; Future  -     Gastrin; Future  -     Miscellaneous Sendout 1; Future  -     TSH without Reflex; Future  -     T4, Free; Future  -     T3, Free; Future  -     PTH, Intact; Future  -     Comprehensive Metabolic Panel;  Future        1: Thyroid evaluation   Check TFTs     2: Flushing/ anxiety / panic attacks     Check metanephrines,  Calcitonin and other hormones     RTC prn     If all work up is normal see EP and then therapist     Electronically signed by Bisi Cheatham MD on 5/26/2021 at 10:46 AM

## 2021-05-27 ENCOUNTER — TELEPHONE (OUTPATIENT)
Dept: ENDOCRINOLOGY | Age: 41
End: 2021-05-27

## 2021-05-29 LAB
CALCITONIN LEVEL: <2 PG/ML (ref 0–5.1)
METANEPH/PLASMA INTERP: NORMAL
METANEPHRINE FREE PLASMA: 0.19 NMOL/L (ref 0–0.49)
NORMETANEPHRINE FREE PLASMA: 0.27 NMOL/L (ref 0–0.89)
SEROTONIN, WB: 226 NG/ML (ref 50–200)

## 2021-05-30 LAB — GASTRIN: 12 PG/ML (ref 0–100)

## 2021-06-11 ENCOUNTER — TELEPHONE (OUTPATIENT)
Dept: ENDOCRINOLOGY | Age: 41
End: 2021-06-11

## 2021-06-14 NOTE — TELEPHONE ENCOUNTER
Serotonin is slightly high (likely due to stress). This level does not correlate to clinical significance.  All the other labs are normal.

## 2021-06-15 NOTE — TELEPHONE ENCOUNTER
PT called to request another call back regarding results. Has questions about possible adrenal insufficiency and Addisons disease. Need confirmation and comfort with the information.

## 2022-06-01 ENCOUNTER — HOSPITAL ENCOUNTER (OUTPATIENT)
Dept: MAMMOGRAPHY | Age: 42
Discharge: HOME | End: 2022-06-01
Payer: COMMERCIAL

## 2022-06-01 VITALS — HEIGHT: 64 IN | BODY MASS INDEX: 27.31 KG/M2 | WEIGHT: 160 LBS

## 2022-06-01 DIAGNOSIS — Z12.31 VISIT FOR SCREENING MAMMOGRAM: ICD-10-CM

## 2022-06-01 PROCEDURE — 77063 BREAST TOMOSYNTHESIS BI: CPT
